# Patient Record
Sex: MALE | Race: WHITE | URBAN - METROPOLITAN AREA
[De-identification: names, ages, dates, MRNs, and addresses within clinical notes are randomized per-mention and may not be internally consistent; named-entity substitution may affect disease eponyms.]

---

## 2017-01-06 ENCOUNTER — OFFICE VISIT (OUTPATIENT)
Dept: FAMILY MEDICINE CLINIC | Age: 27
End: 2017-01-06

## 2017-01-06 VITALS
TEMPERATURE: 97.7 F | DIASTOLIC BLOOD PRESSURE: 70 MMHG | OXYGEN SATURATION: 99 % | SYSTOLIC BLOOD PRESSURE: 117 MMHG | WEIGHT: 163.2 LBS | BODY MASS INDEX: 26.23 KG/M2 | HEART RATE: 62 BPM | HEIGHT: 66 IN | RESPIRATION RATE: 18 BRPM

## 2017-01-06 DIAGNOSIS — Z23 ENCOUNTER FOR IMMUNIZATION: Primary | ICD-10-CM

## 2017-01-06 DIAGNOSIS — Z02.1 PRE-EMPLOYMENT HEALTH SCREENING EXAMINATION: ICD-10-CM

## 2017-01-06 NOTE — PROGRESS NOTES
Emilia Holcomb is a 31 y.o. male presents to office for physical.       1. Have you been to the ER, urgent care clinic or hospitalized since your last visit? no  2. Have you seen any other providers outside of [facility] since your last visit? no  3. Have you had a Flu shot this year?  Yes        Health Maintenance items with a due date reviewed with patient:  Health Maintenance Due   Topic Date Due    DTaP/Tdap/Td series (1 - Tdap) 07/13/2010    INFLUENZA AGE 9 TO ADULT  08/01/2016

## 2017-01-06 NOTE — PROGRESS NOTES
Elliott Wadsworth is a 31 y.o.  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 He denies any significant chronic medical or psychiatric problems that will prevent him from working with children. Chief Complaint   Patient presents with    Physical     Subjective: Additional Concerns: none     Patient Active Problem List    Diagnosis Date Noted    Blepharitis of eyelid of right eye 02/24/2016    Family history of diabetes mellitus (DM) 12/22/2015     Current Outpatient Prescriptions   Medication Sig Dispense Refill    WHEY PROTEIN ISOLATE, BULK, by Does Not Apply route.  CREATINE MONOHYDRATE (CREATINE PO) Take  by mouth. No Known Allergies  Past Medical History   Diagnosis Date    Finger fracture, right      Right 5th digit (cast)    Right eye trauma      History of     No past surgical history on file.   Family History   Problem Relation Age of Onset    Diabetes Mother     High Cholesterol Mother     Deep Vein Thrombosis Mother     Obesity Mother     Diabetes Father     Obesity Father     No Known Problems Maternal Grandmother     Arthritis-rheumatoid Paternal Grandmother     Cancer Paternal Grandfather      lung     Social History   Substance Use Topics    Smoking status: Never Smoker    Smokeless tobacco: Never Used      Comment: h/o black and mild but nothing recent    Alcohol use 0.0 oz/week     0 Standard drinks or equivalent per week      Comment: Occasions/ once a week     ROS     General: negative for - chills, fatigue, fever, weight change  Psych: negative for - anxiety, depression, irritability or mood swings  ENT: negative for - headaches, hearing change, nasal congestion, oral lesions, sneezing or sore throat  Heme/ Lymph: negative for - bleeding problems, bruising, pallor or swollen lymph nodes  Endo: negative for - hot flashes, polydipsia/polyuria or temperature intolerance  Resp: negative for - cough, shortness of breath or wheezing  CV: negative for - chest pain, edema or palpitations  GI: negative for - abdominal pain, change in bowel habits, constipation, diarrhea or nausea/vomiting  : negative for - dysuria, hematuria, incontinence, pelvic pain or vulvar/vaginal symptoms  MSK: negative for - joint pain, joint swelling or muscle pain  Neuro: negative for - confusion, headaches, seizures or weakness  Derm: negative for - dry skin, hair changes, rash or skin lesion changes    Objective:  Vitals:    01/06/17 0933   BP: 117/70   Pulse: 62   Resp: 18   Temp: 97.7 °F (36.5 °C)   TempSrc: Oral   SpO2: 99%   Weight: 163 lb 3.2 oz (74 kg)   Height: 5' 6\" (1.676 m)   PainSc:   0 - No pain       alert, well appearing, and in no distress  Mental status - alert, oriented to person, place, and time, normal mood, behavior, speech, dress, motor activity, and thought processes  Chest - clear to auscultation, no wheezes, rales or rhonchi, symmetric air entry  Heart - normal rate, regular rhythm, normal S1, S2, no murmurs, rubs, clicks or gallops  Extremities - peripheral pulses normal, no pedal edema, no clubbing or cyanosis    LABS   No visits with results within 6 Month(s) from this visit. Latest known visit with results is:    Orders Only on 12/21/2015   Component Date Value Ref Range Status    Triglyceride 12/21/2015 113  40 - 149 mg/dL Final    HDL Cholesterol 12/21/2015 63* 40 - 59 mg/dL Final    Cholesterol, total 12/21/2015 165  110 - 200 mg/dL Final    LDL, calculated 12/21/2015 80  50 - 99 mg/dL Final    VLDL, calculated 12/21/2015 23  8 - 30 mg/dL Final    Comment: Test includes cholesterol, HDL cholesterol, triglycerides and LDL.   Cholesterol Recommended NCEP guidelines in mg/dL:  Less than 200      Desirable  200 - 239          Borderline High  Greater than or  = to 240   High      Glucose 12/21/2015 81  65 - 99 mg/dL Final    BUN 12/21/2015 20  6 - 22 mg/dL Final    Creatinine 12/21/2015 0.9  0.5 - 1.2 mg/dL Final    Sodium 12/21/2015 140  133 - 145 mmol/L Final    Potassium 12/21/2015 4.7  3.5 - 5.5 mmol/L Final    Chloride 12/21/2015 98  98 - 110 mmol/L Final    CO2 12/21/2015 26  20 - 32 mmol/L Final    AST 12/21/2015 20  10 - 37 U/L Final    ALT 12/21/2015 21  5 - 40 U/L Final    Alk. phosphatase 12/21/2015 50  25 - 115 U/L Final    Bilirubin, total 12/21/2015 0.8  0.2 - 1.2 mg/dL Final    Protein, total 12/21/2015 6.9  6.4 - 8.3 g/dL Final    Albumin 12/21/2015 4.8  3.5 - 5.0 g/dL Final    Globulin 12/21/2015 2.1  2.0 - 4.0 g/dL Final    A-G Ratio 12/21/2015 2.3  1.1 - 2.6 ratio Final    Calcium 12/21/2015 9.8  8.4 - 10.4 mg/dL Final    Anion gap 12/21/2015 16.0  mmol/L Final    Comment: Test includes Albumin, Alkaline Phosphatase, ALT, AST, BUN, Calcium, CO2,  Chloride, Creatinine, Glucose, Potassium, Sodium, Total Bilirubin and Total  Protein. Estimated GFR results are reported in mL/min/1.73 sq.m. by the MDRD equation. This eGFR is validated for stable chronic renal failure patients. This   equation  is unreliable in acute illness or patients with normal renal function.  GFRAA 12/21/2015 >60.0  >60.0 Final    GFRNA 12/21/2015 >60.0  >60.0 Final    Hemoglobin A1c 12/21/2015 5.2  4.8 - 5.9 % Final    AVG GLU 12/21/2015 103  91 - 123 mg/dL Final    HIV 1/O/2 Abs 12/21/2015 <1.00  <1.00 Final    Index Value: Specimen reactivity relative to the negative cutoff.  HIV 1/O/2 Abs, QL 12/21/2015 Non Reactive  Non Reacti Final    Comment: Will reflex to HIV 1/2 Supplemental Antibody Test if positive.   Performed at:  [address]  [id number]  : Renee Waggoner MD, Phone:  [phone]      RBC 12/21/2015 4.82  3.80 - 5.80 M/uL Final    HCT 12/21/2015 45.7  36.6 - 51.9 % Final    MCV 12/21/2015 95  80 - 95 fL Final    MCHC 12/21/2015 32  32 - 36 g/dL Final    RDW 12/21/2015 13.2  10.0 - 16.0 % Final    PLATELET 69/55/1120 768  140 - 440 K/uL Final    MPV 12/21/2015 12.2* 6.0 - 10.8 fL Final    WBC 12/21/2015 6.1  4.0 - 11.0 K/uL Final    HGB 12/21/2015 14.6  13.2 - 17.3 g/dL Final    [facility] 12/21/2015 30  26 - 34 pg Final       TESTS  Results for orders placed or performed in visit on 12/21/15   LIPID PANEL   Result Value Ref Range    Triglyceride 113 40 - 149 mg/dL    HDL Cholesterol 63 (H) 40 - 59 mg/dL    Cholesterol, total 165 110 - 200 mg/dL    LDL, calculated 80 50 - 99 mg/dL    VLDL, calculated 23 8 - 30 mg/dL   METABOLIC PANEL, COMPREHENSIVE   Result Value Ref Range    Glucose 81 65 - 99 mg/dL    BUN 20 6 - 22 mg/dL    Creatinine 0.9 0.5 - 1.2 mg/dL    Sodium 140 133 - 145 mmol/L    Potassium 4.7 3.5 - 5.5 mmol/L    Chloride 98 98 - 110 mmol/L    CO2 26 20 - 32 mmol/L    AST 20 10 - 37 U/L    ALT 21 5 - 40 U/L    Alk. phosphatase 50 25 - 115 U/L    Bilirubin, total 0.8 0.2 - 1.2 mg/dL    Protein, total 6.9 6.4 - 8.3 g/dL    Albumin 4.8 3.5 - 5.0 g/dL    Globulin 2.1 2.0 - 4.0 g/dL    A-G Ratio 2.3 1.1 - 2.6 ratio    Calcium 9.8 8.4 - 10.4 mg/dL    Anion gap 16.0 mmol/L    GFRAA >60.0 >60.0    GFRNA >60.0 >60.0   HEMOGLOBIN A1C + EAG ESTIMATION   Result Value Ref Range    Hemoglobin A1c 5.2 4.8 - 5.9 %    AVG  91 - 123 mg/dL   HIV 1/2 AB, BY IMMUNOBLOT   Result Value Ref Range    HIV 1/O/2 Abs <1.00 <1.00    HIV 1/O/2 Abs, QL Non Reactive Non Reacti   COMPLETE BLOOD COUNT STAT   Result Value Ref Range    RBC 4.82 3.80 - 5.80 M/uL    HCT 45.7 36.6 - 51.9 %    MCV 95 80 - 95 fL    MCHC 32 32 - 36 g/dL    RDW 13.2 10.0 - 16.0 %    PLATELET 609 760 - 338 K/uL    MPV 12.2 (H) 6.0 - 10.8 fL    WBC 6.1 4.0 - 11.0 K/uL    HGB 14.6 13.2 - 17.3 g/dL    MCH 30 26 - 34 pg     Assessment/Plan:      Work certification stating patient is clear of any medical or psychiatric conditions that is detrimental or 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 Lab review: no lab studies available for review at time of visit.     I have discussed the diagnosis with the patient and the intended plan as seen in the above orders. The patient has received an after-visit summary and questions were answered concerning future plans. I have discussed medication side effects and warnings with the patient as well. I have reviewed the plan of care with the patient, accepted their input and they are in agreement with the treatment goals.      F/U as needed    Janel Hudson MD

## 2017-01-06 NOTE — PATIENT INSTRUCTIONS
Tetanus and Diphtheria Booster: Care Instructions  Your Care Instructions  A diphtheria and tetanus (Td) vaccine protects people against diphtheria and tetanus (lockjaw). You need a Td shot every 10 years to help prevent these diseases, which can be fatal.  You may also need a Td booster if you get a puncture wound or dirty cut. A booster is another dose of the vaccine. Young teens get a booster at 6to 15years of age. This booster is called Tdap and includes the vaccine against pertussis (whooping cough). All teens and adults who never had Tdap also need one dose of this vaccine. Common side effects of Td vaccination include soreness in the arm where you got the shot and a mild fever. These usually occur within 3 days of the shot and last a short time. Follow-up care is a key part of your treatment and safety. Be sure to make and go to all appointments, and call your doctor if you are having problems. It's also a good idea to know your test results and keep a list of the medicines you take. How can you care for yourself at home? · Take an over-the-counter pain medicine, such as acetaminophen (Tylenol), ibuprofen (Advil, Motrin), or naproxen (Aleve), if your arm is sore after the shot. Be safe with medicines. Read and follow all instructions on the label. Do not give aspirin to anyone younger than 20. It has been linked to Reye syndrome, a serious illness. · Put ice or a cold pack on the sore area for 10 to 20 minutes at a time. Put a thin cloth between the ice and your skin. When should you call for help? Call 911 anytime you think you may need emergency care. For example, call if:  · You have symptoms of a severe allergic reaction. These may include:  ¨ Sudden raised, red areas (hives) all over your body. ¨ Swelling of the throat, mouth, lips, or tongue. ¨ Trouble breathing. ¨ Passing out (losing consciousness). Or you may feel very lightheaded or suddenly feel weak, confused, or restless.   · You have a seizure. Call your doctor now or seek immediate medical care if:  · You have symptoms of an allergic reaction, such as:  ¨ A rash or hives (raised, red areas on the skin). ¨ Itching. ¨ Swelling. ¨ Belly pain, nausea, or vomiting. · You have a high fever. Watch closely for changes in your health, and be sure to contact your doctor if you have any problems. Where can you learn more? Go to http://manjinder-alicia.info/. Enter V332 in the search box to learn more about \"Tetanus and Diphtheria Booster: Care Instructions. \"  Current as of: February 9, 2016  Content Version: 11.1  © 7261-4326 [employer]. Care instructions adapted under license by [employer] (which disclaims liability or warranty for this information). If you have questions about a medical condition or this instruction, always ask your healthcare professional. [address] any warranty or liability for your use of this information.

## 2017-01-31 PROBLEM — Z02.1 PRE-EMPLOYMENT HEALTH SCREENING EXAMINATION: Status: ACTIVE | Noted: 2017-01-31
